# Patient Record
Sex: FEMALE | Race: WHITE | NOT HISPANIC OR LATINO | Employment: FULL TIME | URBAN - METROPOLITAN AREA
[De-identification: names, ages, dates, MRNs, and addresses within clinical notes are randomized per-mention and may not be internally consistent; named-entity substitution may affect disease eponyms.]

---

## 2017-05-10 ENCOUNTER — HISTORICAL (OUTPATIENT)
Dept: ADMINISTRATIVE | Facility: HOSPITAL | Age: 37
End: 2017-05-10

## 2017-06-29 ENCOUNTER — HISTORICAL (OUTPATIENT)
Dept: ADMINISTRATIVE | Facility: HOSPITAL | Age: 37
End: 2017-06-29

## 2017-06-29 LAB
BASOPHILS NFR BLD MANUAL: 3 %
EOSINOPHIL NFR BLD MANUAL: 5 %
ERYTHROCYTE [DISTWIDTH] IN BLOOD BY AUTOMATED COUNT: 11.5 % (ref 11.5–14.5)
GRANULOCYTES NFR BLD MANUAL: 33 %
HCT VFR BLD AUTO: 42.5 % (ref 35–46)
HGB BLD-MCNC: 14.5 GM/DL (ref 12–16)
LYMPHOCYTES NFR BLD MANUAL: 28 %
LYMPHOCYTES NFR BLD MANUAL: 3 %
MCH RBC QN AUTO: 29.5 PG (ref 26–34)
MCHC RBC AUTO-ENTMCNC: 34.1 GM/DL (ref 31–37)
MCV RBC AUTO: 86.6 FL (ref 80–100)
MONOCYTES NFR BLD MANUAL: 17 %
NEUTS BAND NFR BLD MANUAL: 11 %
PLATELET # BLD AUTO: 172 X10(3)/MCL (ref 130–400)
PLATELET # BLD EST: ADEQUATE 10*3/UL
PMV BLD AUTO: 9.8 FL (ref 7.4–10.4)
RBC # BLD AUTO: 4.91 X10(6)/MCL (ref 4–5.2)
RBC MORPH BLD: NORMAL
TSH SERPL-ACNC: 1.31 MIU/L (ref 0.36–3.74)
WBC # SPEC AUTO: 3.8 X10(3)/MCL (ref 4.5–11)

## 2021-09-28 ENCOUNTER — TELEPHONE (OUTPATIENT)
Dept: ORTHOPEDICS | Facility: CLINIC | Age: 41
End: 2021-09-28

## 2021-09-28 DIAGNOSIS — M25.562 PAIN IN BOTH KNEES, UNSPECIFIED CHRONICITY: ICD-10-CM

## 2021-09-28 DIAGNOSIS — M25.551 BILATERAL HIP PAIN: Primary | ICD-10-CM

## 2021-09-28 DIAGNOSIS — M25.552 BILATERAL HIP PAIN: Primary | ICD-10-CM

## 2021-09-28 DIAGNOSIS — M25.561 PAIN IN BOTH KNEES, UNSPECIFIED CHRONICITY: ICD-10-CM

## 2021-09-29 ENCOUNTER — OFFICE VISIT (OUTPATIENT)
Dept: ORTHOPEDICS | Facility: CLINIC | Age: 41
End: 2021-09-29
Payer: MEDICAID

## 2021-09-29 VITALS
HEART RATE: 119 BPM | RESPIRATION RATE: 18 BRPM | SYSTOLIC BLOOD PRESSURE: 148 MMHG | HEIGHT: 68 IN | DIASTOLIC BLOOD PRESSURE: 92 MMHG | BODY MASS INDEX: 21.62 KG/M2 | WEIGHT: 142.63 LBS

## 2021-09-29 DIAGNOSIS — G89.29 CHRONIC PAIN OF BOTH HIPS: ICD-10-CM

## 2021-09-29 DIAGNOSIS — G89.29 CHRONIC PAIN OF BOTH KNEES: ICD-10-CM

## 2021-09-29 DIAGNOSIS — M25.552 CHRONIC PAIN OF BOTH HIPS: ICD-10-CM

## 2021-09-29 DIAGNOSIS — M25.562 CHRONIC PAIN OF BOTH KNEES: ICD-10-CM

## 2021-09-29 DIAGNOSIS — M25.551 CHRONIC PAIN OF BOTH HIPS: ICD-10-CM

## 2021-09-29 DIAGNOSIS — M25.561 CHRONIC PAIN OF BOTH KNEES: ICD-10-CM

## 2021-09-29 DIAGNOSIS — M16.0 PRIMARY OSTEOARTHRITIS OF BOTH HIPS: ICD-10-CM

## 2021-09-29 PROCEDURE — 99999 PR PBB SHADOW E&M-EST. PATIENT-LVL III: ICD-10-PCS | Mod: PBBFAC,,, | Performed by: ORTHOPAEDIC SURGERY

## 2021-09-29 PROCEDURE — 99213 OFFICE O/P EST LOW 20 MIN: CPT | Mod: PBBFAC,PN | Performed by: ORTHOPAEDIC SURGERY

## 2021-09-29 PROCEDURE — 99999 PR PBB SHADOW E&M-EST. PATIENT-LVL III: CPT | Mod: PBBFAC,,, | Performed by: ORTHOPAEDIC SURGERY

## 2021-09-29 PROCEDURE — 99204 PR OFFICE/OUTPT VISIT, NEW, LEVL IV, 45-59 MIN: ICD-10-PCS | Mod: S$PBB,,, | Performed by: ORTHOPAEDIC SURGERY

## 2021-09-29 PROCEDURE — 99204 OFFICE O/P NEW MOD 45 MIN: CPT | Mod: S$PBB,,, | Performed by: ORTHOPAEDIC SURGERY

## 2021-09-29 RX ORDER — LISDEXAMFETAMINE DIMESYLATE 60 MG/1
60 CAPSULE ORAL EVERY MORNING
COMMUNITY
Start: 2021-07-29

## 2021-09-29 RX ORDER — FLUOXETINE HYDROCHLORIDE 40 MG/1
CAPSULE ORAL
COMMUNITY

## 2021-11-03 ENCOUNTER — HOSPITAL ENCOUNTER (OUTPATIENT)
Dept: CARDIOLOGY | Facility: HOSPITAL | Age: 41
Discharge: HOME | End: 2021-11-03
Attending: NURSE PRACTITIONER
Payer: MEDICAID

## 2021-11-03 DIAGNOSIS — Z79.899 ENCOUNTER FOR LONG-TERM (CURRENT) USE OF OTHER MEDICATIONS: ICD-10-CM

## 2021-11-03 PROCEDURE — 93010 ELECTROCARDIOGRAM REPORT: CPT | Mod: ,,, | Performed by: INTERNAL MEDICINE

## 2021-11-03 PROCEDURE — 93010 EKG 12-LEAD: ICD-10-PCS | Mod: ,,, | Performed by: INTERNAL MEDICINE

## 2021-11-03 PROCEDURE — 93005 ELECTROCARDIOGRAM TRACING: CPT | Mod: PO

## 2022-04-07 ENCOUNTER — HISTORICAL (OUTPATIENT)
Dept: ADMINISTRATIVE | Facility: HOSPITAL | Age: 42
End: 2022-04-07
Payer: COMMERCIAL

## 2022-04-24 VITALS
BODY MASS INDEX: 18.91 KG/M2 | WEIGHT: 124.75 LBS | OXYGEN SATURATION: 99 % | SYSTOLIC BLOOD PRESSURE: 108 MMHG | DIASTOLIC BLOOD PRESSURE: 69 MMHG | HEIGHT: 68 IN

## 2022-04-30 NOTE — PROGRESS NOTES
Patient:   Meredith Soriano             MRN: [id number]            FIN: [id number]               Age:   37 years     Sex:  Female     :  1980   Associated Diagnoses:   None   Author:   Daniel De La Rosa MD      Chief Complaint:  PCP establishment    HPI:  37-year-old  female, PCP establishment today in internal medicine clinic.    She was seen 1 month ago here in internal medicine clinic and was complaining of depression, anxiety, and fatigue which has continued.  She has a history of major depressive disorder which has moderately responded to Zoloft however fatigue has worsened.  Previously was on treatment for ADD but not currently on medication.    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  She admits to chronic pain in the right hip periodically sharp and stabbing in quality.      Allergies:   Allergies (1) Active Reaction  No Known Medication Allergies None Documented    Past Medical History:  .Depression  ADHD  Chronic right hip pain    Past Surgical History:   None    Family History:  .No qualifying data available.    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    Highest education: Some college    Exercise    Comment: does not - 2017 14:54 - Cleve Griffin LPN    Home/Environment  05/10/2017  Lives with: Alone    Living situation: Home/Independent    Nutrition/Health  2017  Type of diet: Regular    Substance Abuse  05/10/2017  Use: Never    Tobacco  05/10/2017  Use: Former smoker    Type: Cigarettes    Started at age: 11.0 Years    Comment: Quit at 19y?O - 05/10/2017 08:00 - Virginia Dumont LPN      Medications:Home Medications (1) Active  Pristiq 50 mg oral tablet, extended release  50 mg = 1 tab(s), Oral, Daily    ROS:  Constitutional: Denies weakness, +fatigue, +decreased activity, fever, chills,or sweats  Eye: No recent visual problem, no double vision, no visual disturbance  Ears, Nose, Mouth, Throat: No nasal discharge, pharyngeal erythema, difficulty swallowing, or hearing loss  Respiratory: No shortness of breath, no cough, no sputum production, no hemoptysis, no wheezing  Cardiovascular: No palpitations, no tachycardia, no chest pain, no bradycardia, no peripheral edema, no syncope  Gastrointestinal: No nausea, no vomiting, no diarrhea, no constipation, no abdominal pain  Musculoskeletal: No back pain, no neck pain, no joint pain, no muscle pain, no claudication.  Neurologic: Alert and oriented X4, no confusion, no abnormal balance, no numbness, no tingling, no headache.  Psychiatric: No anxiety, + depression, not delusional, no hallucinations, no inés, not suicidal.      Physical Exam:  Vital Signs (last 24 hrs)_____  Last Charted___________  Temp Oral     37.1 DegC  (JUN 29 14:50)  Heart Rate Peripheral   76 bpm  (JUN 29 14:50)  Resp Rate         18 br/min  (JUN 29 14:50)  SBP      105 mmHg  (JUN 29 14:50)  DBP      73 mmHg  (JUN 29 14:50)  Weight      61.7 kg  (JUN 29 14:50)  Height      172 cm  (JUN 29 14:50)  BMI      20.86  (JUN 29 14:50)     General:  Alert and oriented, No acute distress.    Eye:  Pupils are equal, round and reactive to light, Extraocular movements are intact.    Neck:  Supple, Non-tender, No carotid bruit, No jugular venous distention, No lymphadenopathy, No thyromegaly, Negative hepatojugular reflex.    Respiratory:  Lungs are clear to auscultation, Respirations are non-labored, Breath sounds are equal, Symmetrical chest wall expansion, No chest wall tenderness.    Cardiovascular:  Normal rate, Regular rhythm, No murmur, No gallop, Good pulses equal in all extremities, Normal peripheral perfusion, No edema.    Gastrointestinal:  Soft, Non-tender,  Non-distended, Normal bowel sounds, No organomegaly.    Genitourinary:  No costovertebral angle tenderness.    Musculoskeletal:  Normal range of motion, Normal strength, No tenderness, No swelling, No deformity, Normal gait.    Integumentary:  Warm, No rash.    Neurologic:  Alert, Oriented, Normal sensory, Normal motor function, No focal deficits, Cranial Nerves II-XII are grossly intact, Normal deep tendon reflexes.    Cognition and Speech:  Oriented, Speech clear and coherent.    Psychiatric:  Cooperative, Appropriate mood & affect.        Assessment & Plan:  1.  Major depression  Continues to have depressed mood which has moderately responded to Zoloft however worsening fatigue  She continued to complain of worsening fatigue, weight gain, and lethargy which may be explained by depression however we'll follow-up with CBC and TSH  We'll switch antidepressants to Desvenlafaxine 50 mg    2.  Chronic right hip pain  Follow-up x-ray the right hip revealed protrusio acetabulum  She admits to chronic periodic sharp pains in the right hip when she walks  Refer to orthopedic clinic for evaluation

## 2022-04-30 NOTE — PROGRESS NOTES
"   Patient:   Meredith Soriano             MRN: [id number]            FIN: [id number]               Age:   37 years     Sex:  Female     :  1980   Associated Diagnoses:   None   Author:   Kevin Hernández MD      Basic Information   Chief complaint: Establish PCP, depression    History of present illness: Ms. Soriano is a 37-year-old  female who presents to my clinic.  Establish care.  Patient was sitting in his practitioner at  and last saw her about 6 months ago.  Today, she reports chronic intermittent pain in her right hip which she attributes to being born with a dislocated hip and occasional/intermittent sharp pains in the right thigh that resolved spontaneously.  She also reports that she was diagnosed with ADD and depression and was started on Vyvanse, Zoloft with improved symptoms.  Patient has been off of medicines for many months now.  She requires for new scripts.  Does not report any recent fever, chills, nausea or vomiting, chest pain or shortness of breath, palpitations, dizziness or syncope.  She reports a warty growth on the anterior aspect of right shoulder for the last "few" months.  No pain itching noted.  She is worried if it is cancer.    Past medical history: As documented above    Past surgical history:None reported    Social history: Drinks beer 2-3 times a week, no binge drinking, Cage questionnaire 0, no tobacco or illicit drug use reported    Family history: None reported    Allergies: No medication allergies      Review of Systems   14 point review of systems negative except as documented above      Physical Examination   Vital Signs   5/10/2017 7:54 CDT       Temperature Oral          36.7 DegC                             Peripheral Pulse Rate     73 bpm                             Respiratory Rate          18 br/min                             Systolic Blood Pressure   113 mmHg                             Diastolic Blood Pressure  77 mmHg     General:  Alert and " oriented, No acute distress.    Eye:  Extraocular movements are intact, Normal conjunctiva.    HENT:  Normocephalic, Oral mucosa is moist.    Neck:  Supple, Non-tender, No carotid bruit.    Respiratory:  Lungs are clear to auscultation, Respirations are non-labored, Breath sounds are equal.    Cardiovascular:  Normal rate, Regular rhythm, No murmur, Good pulses equal in all extremities, No edema.    Gastrointestinal:  Soft, Non-tender, Non-distended, Normal bowel sounds.    Integumentary:  Warm, Moist, Multiple freckles, about 3 mm x 3 mm warty lesion noted on the anterior aspect of right shoulder.    Neurologic:  Alert, Oriented, No focal deficits.    Psychiatric:  Cooperative.       Impression and Plan   1.  Major depression  No suicidal or homicidal ideation  Resumed Zoloft 100 mg daily    2.  Chronic right hip pain  No current trauma but born with a dislocated hip  Will obtain x-rays and follow up with results    3.  Referred to minor surgery clinic for wart removal    4.  Refer to gynecology for Pap smear    5.  History of ADDon Vyvanse in the past  Referred to [facility]    We'll follow up in 5-6 months

## 2022-07-08 ENCOUNTER — HOSPITAL ENCOUNTER (OUTPATIENT)
Dept: PULMONOLOGY | Facility: HOSPITAL | Age: 42
Discharge: HOME | End: 2022-07-08
Attending: NURSE PRACTITIONER
Payer: COMMERCIAL

## 2022-07-08 DIAGNOSIS — F90.9 ATTENTION DEFICIT HYPERACTIVITY DISORDER: ICD-10-CM

## 2022-07-08 PROCEDURE — 93010 EKG 12-LEAD: ICD-10-PCS | Mod: ,,, | Performed by: INTERNAL MEDICINE

## 2022-07-08 PROCEDURE — 93010 ELECTROCARDIOGRAM REPORT: CPT | Mod: ,,, | Performed by: INTERNAL MEDICINE

## 2022-07-08 PROCEDURE — 93005 ELECTROCARDIOGRAM TRACING: CPT

## 2022-11-14 ENCOUNTER — HOSPITAL ENCOUNTER (OUTPATIENT)
Dept: RADIOLOGY | Facility: HOSPITAL | Age: 42
Discharge: HOME | End: 2022-11-14
Attending: ORTHOPAEDIC SURGERY
Payer: COMMERCIAL

## 2022-11-14 DIAGNOSIS — M25.551 BILATERAL HIP PAIN: Primary | ICD-10-CM

## 2022-11-14 DIAGNOSIS — M25.552 BILATERAL HIP PAIN: Primary | ICD-10-CM

## 2022-11-14 DIAGNOSIS — M25.552 BILATERAL HIP PAIN: ICD-10-CM

## 2022-11-14 DIAGNOSIS — M25.551 BILATERAL HIP PAIN: ICD-10-CM

## 2022-11-14 PROCEDURE — 73521 X-RAY EXAM HIPS BI 2 VIEWS: CPT | Mod: TC

## 2022-11-14 PROCEDURE — 73521 XR HIPS BILATERAL 2 VIEW INCL AP PELVIS: ICD-10-PCS | Mod: 26,,, | Performed by: RADIOLOGY

## 2022-11-14 PROCEDURE — 73521 X-RAY EXAM HIPS BI 2 VIEWS: CPT | Mod: 26,,, | Performed by: RADIOLOGY

## 2022-11-15 ENCOUNTER — OFFICE VISIT (OUTPATIENT)
Dept: ORTHOPEDICS | Facility: CLINIC | Age: 42
End: 2022-11-15
Payer: COMMERCIAL

## 2022-11-15 ENCOUNTER — OFFICE VISIT (OUTPATIENT)
Dept: SLEEP MEDICINE | Facility: CLINIC | Age: 42
End: 2022-11-15
Payer: COMMERCIAL

## 2022-11-15 VITALS — HEIGHT: 68 IN | BODY MASS INDEX: 23.32 KG/M2 | WEIGHT: 153.88 LBS

## 2022-11-15 VITALS
WEIGHT: 158 LBS | DIASTOLIC BLOOD PRESSURE: 90 MMHG | BODY MASS INDEX: 24.02 KG/M2 | HEART RATE: 102 BPM | SYSTOLIC BLOOD PRESSURE: 133 MMHG

## 2022-11-15 DIAGNOSIS — G47.10 HYPERSOMNOLENCE: Primary | ICD-10-CM

## 2022-11-15 DIAGNOSIS — M16.11 PRIMARY OSTEOARTHRITIS OF RIGHT HIP: Primary | ICD-10-CM

## 2022-11-15 DIAGNOSIS — R35.1 NOCTURIA: ICD-10-CM

## 2022-11-15 PROCEDURE — 1159F PR MEDICATION LIST DOCUMENTED IN MEDICAL RECORD: ICD-10-PCS | Mod: CPTII,S$GLB,, | Performed by: INTERNAL MEDICINE

## 2022-11-15 PROCEDURE — 3008F BODY MASS INDEX DOCD: CPT | Mod: CPTII,S$GLB,, | Performed by: INTERNAL MEDICINE

## 2022-11-15 PROCEDURE — 1159F MED LIST DOCD IN RCRD: CPT | Mod: CPTII,S$GLB,, | Performed by: INTERNAL MEDICINE

## 2022-11-15 PROCEDURE — 3008F BODY MASS INDEX DOCD: CPT | Mod: CPTII,S$GLB,, | Performed by: ORTHOPAEDIC SURGERY

## 2022-11-15 PROCEDURE — 99999 PR PBB SHADOW E&M-EST. PATIENT-LVL II: ICD-10-PCS | Mod: PBBFAC,,, | Performed by: INTERNAL MEDICINE

## 2022-11-15 PROCEDURE — 3008F PR BODY MASS INDEX (BMI) DOCUMENTED: ICD-10-PCS | Mod: CPTII,S$GLB,, | Performed by: INTERNAL MEDICINE

## 2022-11-15 PROCEDURE — 1159F MED LIST DOCD IN RCRD: CPT | Mod: CPTII,S$GLB,, | Performed by: ORTHOPAEDIC SURGERY

## 2022-11-15 PROCEDURE — 99999 PR PBB SHADOW E&M-EST. PATIENT-LVL III: ICD-10-PCS | Mod: PBBFAC,,, | Performed by: ORTHOPAEDIC SURGERY

## 2022-11-15 PROCEDURE — 3008F PR BODY MASS INDEX (BMI) DOCUMENTED: ICD-10-PCS | Mod: CPTII,S$GLB,, | Performed by: ORTHOPAEDIC SURGERY

## 2022-11-15 PROCEDURE — 99999 PR PBB SHADOW E&M-EST. PATIENT-LVL III: CPT | Mod: PBBFAC,,, | Performed by: ORTHOPAEDIC SURGERY

## 2022-11-15 PROCEDURE — 99213 PR OFFICE/OUTPT VISIT, EST, LEVL III, 20-29 MIN: ICD-10-PCS | Mod: S$GLB,,, | Performed by: ORTHOPAEDIC SURGERY

## 2022-11-15 PROCEDURE — 1160F RVW MEDS BY RX/DR IN RCRD: CPT | Mod: CPTII,S$GLB,, | Performed by: ORTHOPAEDIC SURGERY

## 2022-11-15 PROCEDURE — 3080F PR MOST RECENT DIASTOLIC BLOOD PRESSURE >= 90 MM HG: ICD-10-PCS | Mod: CPTII,S$GLB,, | Performed by: INTERNAL MEDICINE

## 2022-11-15 PROCEDURE — 99204 OFFICE O/P NEW MOD 45 MIN: CPT | Mod: S$GLB,,, | Performed by: INTERNAL MEDICINE

## 2022-11-15 PROCEDURE — 1159F PR MEDICATION LIST DOCUMENTED IN MEDICAL RECORD: ICD-10-PCS | Mod: CPTII,S$GLB,, | Performed by: ORTHOPAEDIC SURGERY

## 2022-11-15 PROCEDURE — 3075F PR MOST RECENT SYSTOLIC BLOOD PRESS GE 130-139MM HG: ICD-10-PCS | Mod: CPTII,S$GLB,, | Performed by: INTERNAL MEDICINE

## 2022-11-15 PROCEDURE — 99999 PR PBB SHADOW E&M-EST. PATIENT-LVL II: CPT | Mod: PBBFAC,,, | Performed by: INTERNAL MEDICINE

## 2022-11-15 PROCEDURE — 1160F PR REVIEW ALL MEDS BY PRESCRIBER/CLIN PHARMACIST DOCUMENTED: ICD-10-PCS | Mod: CPTII,S$GLB,, | Performed by: ORTHOPAEDIC SURGERY

## 2022-11-15 PROCEDURE — 99204 PR OFFICE/OUTPT VISIT, NEW, LEVL IV, 45-59 MIN: ICD-10-PCS | Mod: S$GLB,,, | Performed by: INTERNAL MEDICINE

## 2022-11-15 PROCEDURE — 3075F SYST BP GE 130 - 139MM HG: CPT | Mod: CPTII,S$GLB,, | Performed by: INTERNAL MEDICINE

## 2022-11-15 PROCEDURE — 99213 OFFICE O/P EST LOW 20 MIN: CPT | Mod: S$GLB,,, | Performed by: ORTHOPAEDIC SURGERY

## 2022-11-15 PROCEDURE — 3080F DIAST BP >= 90 MM HG: CPT | Mod: CPTII,S$GLB,, | Performed by: INTERNAL MEDICINE

## 2022-11-15 RX ORDER — CLONIDINE HYDROCHLORIDE 0.1 MG/1
0.1 TABLET ORAL NIGHTLY
COMMUNITY
Start: 2022-09-14

## 2022-11-15 NOTE — PROGRESS NOTES
NEW PATIENT VISIT    Meredith Sorinao  is a pleasant 42 y.o. female  with PMH significant for depression who presents for evaluation of sleepiness. The patient started taking lexapro in the last couple weeks and vyvanse in the last year. The patient has nexplanon implant in the  right arm.     Prior sleep studies:   None     Trouble falling asleep?: No  Trouble staying asleep?: No  Hypnotic use?:  Z-quil, melatonin, clonidine (not taking as prescribed)     Bed partner:    Yes  Witnessed snoring ?:   No  Snoring arousals?:  No   Witnessed apneas?  No    Sleepy if inactive?:  Yes  ESS:        SLEEP SCHEDULE   Bed Time 9:30, frequently in front of TV   Sleep Latency 10 minutes    Arousals Once in a while to urinate    Back to sleep No    Wake time 7:15 (can sleep in until mid afternoon on occasion)   Naps Yes, sometimes when home from work    Nocturia 1-2 a night    Work [place],  for last year         Vitals:    11/15/22 0953   BP: (!) 133/90   Pulse: 102   Weight: 71.7 kg (158 lb)       Physical Exam:    GEN:   Well-appearing  Psych:  Appropriate affect, demonstrates insight  SKIN:  No rash on the face or bridge of the nose  HEENT: MP 2,  No significant tonsillar hypertrophy      LABS:   No results found for: HGB, CO2    RECORDS REVIEWED PREVIOUSLY:    No prior sleep testing.    ASSESSMENT    No flowsheet data found.    Presenting Complaint:    PROBLEM DESCRIPTION/ Sx on Presentation  STATUS   screening EMERY   denies snoring, denies snoring arousals, denies witnessed apneas , + current bed partner    New   Daytime Sx   + sleepiness when inactive in the evening  Hard to wake up in the morning  Unintened naps  Medications: vyvanse 60mg started circa 2021  Denies H/H hallucinations, rare sleep paralysis.  No sleep attacks    ESS 8/24 on intake (On vyvanse)  New   Nocturia   x 1-2 per sleep period  New   Other issues: ADHD    PLAN     -very low risk of EMERY  -will give sleep diary  -will pursue PSG  with MSLT out of concern for central hypersomnia in light of severe sleepiness going on for more than 2 months despite adequate sleep time, difficulty waking up in the morning, prolonged TST,  unintended napsand no medications or medical conditions likely to cause sleepiness (note: her ESS is only 8, but she is currently on vyvanse for psychiatric reasons)          RTC          The patient was given open opportunity to ask questions and/or express concerns about treatment plan.   All questions/concerns were discussed.     Two patient identifiers used prior to evaluation.

## 2022-11-18 NOTE — PROGRESS NOTES
"Subjective:       Patient ID: Meredith Soriano is a 42 y.o. female.    Chief Complaint:   Pain of the Right Hip and Pain of the Left Hip  43 yo female reports longstanding h/o atraumatic bilateral hip and knee pain.  Her right hip is by far her worst pain source.  Hip pain localized to groin and anterior thigh, right greater than left, and aggravated with walking and rising from a chair. Notes limited right hip motion with difficulty putting on shoes/socks. She c/o anterior knee pain, aggravated with bending and stair climbing. Notes associated knee clicking/cracking. Denies LBP, motor weakness, paresthesias, knee swelling/locking/giving or constitutional sxs. Takes OTC NSAIDs prn. Walks without assistive device. She was seen at [facility] in 6/2021 and they recommended PT. She had to have braces when she was a child, but no hip surgeries.    No past medical history on file.  No past surgical history on file.  No family history on file.  Social History     Socioeconomic History    Marital status: Single   Tobacco Use    Smoking status: Never    Smokeless tobacco: Never   Substance and Sexual Activity    Alcohol use: Yes    Drug use: Yes     Types: Marijuana       Current Outpatient Medications   Medication Sig Dispense Refill    cloNIDine (CATAPRES) 0.1 MG tablet Take 0.1 mg by mouth every evening.      FLUoxetine 40 MG capsule 1 capsule      VYVANSE 60 mg capsule Take 60 mg by mouth every morning.       No current facility-administered medications for this visit.     Review of patient's allergies indicates:  No Known Allergies      Objective:      Vitals:    11/15/22 1307   Weight: 69.8 kg (153 lb 14.1 oz)   Height: 5' 8" (1.727 m)     Physical Exam  Positive C sign, positive Stinchfield sign.  No tenderness at the greater trochanter or iliotibial band.  No tenderness at the SI joint.  The patient has pain in the groin with passive flexion and internal rotation of the hip which is limited.  Knee benign without " tenderness or effusion, with normal range of motion.  Skin intact.  Distal neurovascular intact.  Flip test negative.    Imaging Review:   X-rays show protrusio of the right hip with moderate arthrosis in no acute findings or focal bone defects.  Assessment:   Right hip DJD with protrusio  Plan:       She has had 1 provider recommend immediate surgery, and another tell her to wait as long as possible.  Our discussion sought a middle ground with her decision for surgery centering around the severity of her pain and whether or not she can control it non operatively.  We will go ahead and have her work with physical therapy, and follow-up with me as she wishes.  The patient's pathophysiology was explained in detail with reference to x-rays, models, other visual aids as appropriate.  Treatment options were discussed in detail.  Questions were invited and answered to the patient's satisfaction.    Eyal Griffith MD  Orthopaedic Surgery

## 2022-11-23 DIAGNOSIS — G47.10 HYPERSOMNOLENCE: Primary | ICD-10-CM

## 2022-11-28 ENCOUNTER — PATIENT MESSAGE (OUTPATIENT)
Dept: SLEEP MEDICINE | Facility: CLINIC | Age: 42
End: 2022-11-28
Payer: COMMERCIAL

## 2022-11-28 DIAGNOSIS — G47.10 HYPERSOMNOLENCE: Primary | ICD-10-CM

## 2022-11-28 DIAGNOSIS — R35.1 NOCTURIA: ICD-10-CM

## 2022-11-30 ENCOUNTER — TELEPHONE (OUTPATIENT)
Dept: SLEEP MEDICINE | Facility: OTHER | Age: 42
End: 2022-11-30
Payer: COMMERCIAL

## 2022-12-01 ENCOUNTER — OFFICE VISIT (OUTPATIENT)
Dept: PODIATRY | Facility: CLINIC | Age: 42
End: 2022-12-01
Payer: COMMERCIAL

## 2022-12-01 DIAGNOSIS — M79.671 RIGHT FOOT PAIN: ICD-10-CM

## 2022-12-01 DIAGNOSIS — M20.10 VALGUS DEFORMITY OF GREAT TOE, UNSPECIFIED LATERALITY: Primary | ICD-10-CM

## 2022-12-01 PROCEDURE — 1160F RVW MEDS BY RX/DR IN RCRD: CPT | Mod: CPTII,S$GLB,, | Performed by: PODIATRIST

## 2022-12-01 PROCEDURE — 1159F PR MEDICATION LIST DOCUMENTED IN MEDICAL RECORD: ICD-10-PCS | Mod: CPTII,S$GLB,, | Performed by: PODIATRIST

## 2022-12-01 PROCEDURE — 99203 PR OFFICE/OUTPT VISIT, NEW, LEVL III, 30-44 MIN: ICD-10-PCS | Mod: S$GLB,,, | Performed by: PODIATRIST

## 2022-12-01 PROCEDURE — 99203 OFFICE O/P NEW LOW 30 MIN: CPT | Mod: S$GLB,,, | Performed by: PODIATRIST

## 2022-12-01 PROCEDURE — 1159F MED LIST DOCD IN RCRD: CPT | Mod: CPTII,S$GLB,, | Performed by: PODIATRIST

## 2022-12-01 PROCEDURE — 1160F PR REVIEW ALL MEDS BY PRESCRIBER/CLIN PHARMACIST DOCUMENTED: ICD-10-PCS | Mod: CPTII,S$GLB,, | Performed by: PODIATRIST

## 2022-12-01 PROCEDURE — 99999 PR PBB SHADOW E&M-EST. PATIENT-LVL III: ICD-10-PCS | Mod: PBBFAC,,, | Performed by: PODIATRIST

## 2022-12-01 PROCEDURE — 99999 PR PBB SHADOW E&M-EST. PATIENT-LVL III: CPT | Mod: PBBFAC,,, | Performed by: PODIATRIST

## 2022-12-01 NOTE — PROGRESS NOTES
Subjective:      Patient ID: Meredith Soriano is a 42 y.o. female.    Chief Complaint: right HAV    42 y.o. female presenting with right foot bunion.  Points medial aspect of the 1st metatarsal head and between 1st and 2nd toe area for pain.  Describes pain as aching.  Patient is ambulating in regular tennis shoes.  Patient has past medical history of chronic hip conditions that will require total hip replacement in the future (right hip).  Denies acute foot injury.  Pain gets worse when she wears closed shoes.      Review of Systems   Constitutional: Negative for chills, decreased appetite, fever and malaise/fatigue.   HENT:  Negative for congestion, ear discharge and sore throat.    Eyes:  Negative for discharge and pain.   Cardiovascular:  Negative for chest pain, claudication and leg swelling.   Respiratory:  Negative for cough and shortness of breath.    Skin:  Negative for color change, nail changes and rash.   Musculoskeletal:  Negative for arthritis, joint pain, joint swelling and muscle weakness.        Right foot pain   Gastrointestinal:  Negative for bloating, abdominal pain, diarrhea, nausea and vomiting.   Genitourinary:  Negative for flank pain and hematuria.   Neurological:  Negative for headaches, numbness and weakness.   Psychiatric/Behavioral:  Negative for altered mental status.            No past medical history on file.    No past surgical history on file.    No family history on file.    Social History     Socioeconomic History    Marital status: Single   Tobacco Use    Smoking status: Never    Smokeless tobacco: Never   Substance and Sexual Activity    Alcohol use: Yes    Drug use: Yes     Types: Marijuana       Current Outpatient Medications   Medication Sig Dispense Refill    cloNIDine (CATAPRES) 0.1 MG tablet Take 0.1 mg by mouth every evening.      FLUoxetine 40 MG capsule 1 capsule      VYVANSE 60 mg capsule Take 60 mg by mouth every morning.       No current facility-administered medications  for this visit.       Review of patient's allergies indicates:  No Known Allergies    There were no vitals filed for this visit.    Objective:      Physical Exam  Constitutional:       General: She is not in acute distress.     Appearance: She is well-developed.   HENT:      Nose: Nose normal.   Eyes:      Conjunctiva/sclera: Conjunctivae normal.   Pulmonary:      Effort: Pulmonary effort is normal.   Chest:      Chest wall: No tenderness.   Abdominal:      Tenderness: There is no abdominal tenderness.   Musculoskeletal:      Cervical back: Normal range of motion.   Neurological:      Mental Status: She is alert and oriented to person, place, and time.   Psychiatric:         Behavior: Behavior normal.       Vascular: Distal DP/PT pulses palpable 2/4. CRT < 3 sec to tips of toes. No vericosities noted to LEs. Hair growth present LE, warm to touch LE, No edema noted to LE.    Dermatologic: No open lesions, lacerations or wounds. Interdigital spaces clean, dry and intact. No erythema, rubor, calor noted LE    Musculoskeletal: MMT 5/5 in DF/PF/Inv/Ev resistance with no reproduction of pain in any direction. Passive range of motion of ankle and pedal joints is painless. No calf tenderness LE, Compartments soft/compressible.   Right foot:  Pain on palpation medial aspect of the 1st met head, and tender to touch between 1st and 2nd toe.  Tracking of 1st metatarsophalangeal joint, 1st metatarsophalangeal joint pain during range of motion.  First MPJ range of motion within normal range. No submet2 pain.     Neurological: Light touch, proprioception, and sharp/dull sensation are all intact. Protective threshold with the Rochester-Wienstein monofilament is intact. Vibratory sensation intact.             Assessment:       Encounter Diagnoses   Name Primary?    Valgus deformity of great toe, unspecified laterality Yes    Right foot pain          Plan:       Diagnoses and all orders for this visit:    Valgus deformity of great toe,  unspecified laterality  -     X-Ray Foot Complete 3 view Bilateral; Future    Right foot pain    I counseled the patient on her conditions, their implications and medical management.    42 y.o. female with right foot HAV.     -right foot x-ray reviewed with the patient.  Met adductus with increased IM.  No osteoarthritic changes at 1st MPJ. Increased HAA.   -different treatment options were discussed with the patient.  I went over both conservative and surgical options.  Patient is not looking for surgery at this time.  Recommend bunion pad, bunion splint, wider toe box shoes.     -I reviewed imaging, clinical history, and diagnosis as above with the patient. I attempted to use layman's terms to educate the patient as well as utilize foot models and/or pictures. I personally went through imaging with the patient.    -The nature of the condition, options for management, as well as potential risks and complications were discussed in detail with patient. Patient was amenable to my recommendations and left my office fully informed and will follow up as instructed or sooner if necessary.    -f/u prn     Note dictated with voice recognition software, please excuse any grammatical errors.

## 2022-12-20 ENCOUNTER — TELEPHONE (OUTPATIENT)
Dept: SLEEP MEDICINE | Facility: OTHER | Age: 42
End: 2022-12-20
Payer: COMMERCIAL

## 2022-12-20 NOTE — TELEPHONE ENCOUNTER
Patient states she called her insurance and they approved the over night sleep study.  I gave her Rima's number for when she's ready to schedule the sleep study.

## 2024-04-15 DIAGNOSIS — M50.30 DDD (DEGENERATIVE DISC DISEASE), CERVICAL: Primary | ICD-10-CM

## 2024-04-18 NOTE — PROGRESS NOTES
DATE: 2024  PATIENT: Meredith Soriano    Supervising Physician: Gil Carranza M.D.    CHIEF COMPLAINT: neck pain, headaches    HISTORY:  Meredith Soriano is a 44 y.o. female here for initial evaluation of neck pain and headaches (Neck - 3, Arm - 0). The pain has been present for years. The patient describes the pain as aching in her neck and headaches that occur after cracking her neck. The pain is worse with nothing and improved by nothing and is constant. There is negative associated numbness and tingling. There is negative subjective weakness. Prior treatments have included home exercises, but no ESIs or surgery. Pt reports being seen in the ED recently for a headache behind her right eye that felt like throbbing. She denies aura or vision changes. Pt says it lasted about a day and went away on its own.    The patient denies myelopathic symptoms such as handwriting changes or difficulty with buttons/coins/keys. Denies perineal paresthesias, bowel/bladder dysfunction.    PAST MEDICAL/SURGICAL HISTORY:  No past medical history on file.  No past surgical history on file.    Medications:  Current Outpatient Medications on File Prior to Visit   Medication Sig Dispense Refill    cloNIDine (CATAPRES) 0.1 MG tablet Take 0.1 mg by mouth every evening.      [] cyclobenzaprine (FLEXERIL) 10 MG tablet Take 1 tablet (10 mg total) by mouth 3 (three) times daily as needed for Muscle spasms. 21 tablet 0    FLUoxetine 40 MG capsule 1 capsule      naproxen (NAPROSYN) 500 MG tablet Take 1 tablet (500 mg total) by mouth 2 (two) times daily with meals. 20 tablet 0    prochlorperazine (COMPAZINE) 10 MG tablet Take 1 tablet (10 mg total) by mouth every 6 (six) hours as needed (headache or nausea). 15 tablet 0    VYVANSE 60 mg capsule Take 60 mg by mouth every morning.       No current facility-administered medications on file prior to visit.       Social History:   Social History     Socioeconomic History    Marital status: Single    Tobacco Use    Smoking status: Never    Smokeless tobacco: Never   Substance and Sexual Activity    Alcohol use: Yes    Drug use: Yes     Types: Marijuana       REVIEW OF SYSTEMS:  Constitution: Negative. Negative for chills, fever and night sweats.   Cardiovascular: Negative for chest pain and syncope.   Respiratory: Negative for cough and shortness of breath.   Gastrointestinal: See HPI. Negative for nausea/vomiting. Negative for abdominal pain.  Genitourinary: See HPI. Negative for discoloration or dysuria.  Skin: Negative for dry skin, itching and rash.   Hematologic/Lymphatic: Negative for bleeding problem. Does not bruise/bleed easily.   Musculoskeletal: Negative for falls and muscle weakness.   Neurological: See HPI. No seizures.   Endocrine: Negative for polydipsia, polyphagia and polyuria.   Allergic/Immunologic: Negative for hives and persistent infections.  Psychiatric/Behavioral: Negative for depression and insomnia.         EXAM:  There were no vitals taken for this visit.    General: The patient is a very pleasant 44 y.o. female in no apparent distress, the patient is oriented to person, place and time.  Psych: Normal mood and affect  HEENT: Vision grossly intact, hearing intact to the spoken word.  Lungs: Respirations unlabored.  Gait: Normal station and gait, no difficulty with toe or heel walk.   Skin: Cervical skin negative for rashes, lesions, hairy patches and surgical scars.  Range of motion: Cervical range of motion is acceptable. There is negative tenderness to palpation.  Spinal Balance: Global saggital and coronal spinal balance acceptable, no significant for scoliosis and kyphosis.  Musculoskeletal: No pain with the range of motion of the bilateral shoulders and elbows. Normal bulk and contour of the bilateral hands.  Vascular: Bilateral hands warm and well perfused, radial pulses 2+ bilaterally.  Neurological: Normal strength and tone in all major motor groups in the bilateral upper and  "lower extremities. Normal sensation to light touch in the C5-T1 and L2-S1 dermatomes bilaterally.  Deep tendon reflexes symmetric 2+ in the bilateral upper and lower extremities.  Negative Inverted Radial Reflex and Motley's bilaterally. Negative Babinski bilaterally.     IMAGING:   Today I personally reviewed MRI cervical report (2021) that details disc bulge at C5-6.    There is no height or weight on file to calculate BMI.    No results found for: "HGBA1C"        ASSESSMENT/PLAN:    There are no diagnoses linked to this encounter.    Today we discussed at length all of the different treatment options including anti-inflammatories, acetaminophen, rest, ice, heat, physical therapy including strengthening and stretching exercises, home exercises, ROM, aerobic conditioning, aqua therapy, other modalities including ultrasound, massage, and dry needling, epidural steroid injections and finally surgical intervention.      Pt presents with chronic neck pain and headaches. Will obtain MRI brain to further evaluate. Will send mobic and robaxin to pharmacy.    "

## 2024-04-19 ENCOUNTER — OFFICE VISIT (OUTPATIENT)
Dept: ORTHOPEDICS | Facility: CLINIC | Age: 44
End: 2024-04-19
Attending: ORTHOPAEDIC SURGERY
Payer: COMMERCIAL

## 2024-04-19 ENCOUNTER — PATIENT MESSAGE (OUTPATIENT)
Dept: ORTHOPEDICS | Facility: CLINIC | Age: 44
End: 2024-04-19

## 2024-04-19 ENCOUNTER — TELEPHONE (OUTPATIENT)
Dept: NEUROLOGY | Facility: CLINIC | Age: 44
End: 2024-04-19
Payer: COMMERCIAL

## 2024-04-19 VITALS — BODY MASS INDEX: 27.26 KG/M2 | WEIGHT: 179.88 LBS | HEIGHT: 68 IN

## 2024-04-19 DIAGNOSIS — G89.29 CHRONIC NONINTRACTABLE HEADACHE, UNSPECIFIED HEADACHE TYPE: Primary | ICD-10-CM

## 2024-04-19 DIAGNOSIS — M50.30 DEGENERATIVE DISC DISEASE, CERVICAL: ICD-10-CM

## 2024-04-19 DIAGNOSIS — R51.9 CHRONIC NONINTRACTABLE HEADACHE, UNSPECIFIED HEADACHE TYPE: Primary | ICD-10-CM

## 2024-04-19 DIAGNOSIS — S16.1XXA STRAIN OF NECK MUSCLE, INITIAL ENCOUNTER: ICD-10-CM

## 2024-04-19 PROCEDURE — 3008F BODY MASS INDEX DOCD: CPT | Mod: CPTII,S$GLB,, | Performed by: ORTHOPAEDIC SURGERY

## 2024-04-19 PROCEDURE — 99214 OFFICE O/P EST MOD 30 MIN: CPT | Mod: S$GLB,,, | Performed by: ORTHOPAEDIC SURGERY

## 2024-04-19 PROCEDURE — 99999 PR PBB SHADOW E&M-EST. PATIENT-LVL III: CPT | Mod: PBBFAC,,, | Performed by: ORTHOPAEDIC SURGERY

## 2024-04-19 PROCEDURE — 1159F MED LIST DOCD IN RCRD: CPT | Mod: CPTII,S$GLB,, | Performed by: ORTHOPAEDIC SURGERY

## 2024-04-19 RX ORDER — METHOCARBAMOL 750 MG/1
750 TABLET, FILM COATED ORAL 3 TIMES DAILY
Qty: 90 TABLET | Refills: 0 | Status: SHIPPED | OUTPATIENT
Start: 2024-04-19 | End: 2024-05-19

## 2024-04-19 RX ORDER — MELOXICAM 15 MG/1
15 TABLET ORAL DAILY
Qty: 30 TABLET | Refills: 0 | Status: SHIPPED | OUTPATIENT
Start: 2024-04-19

## 2024-05-01 ENCOUNTER — HOSPITAL ENCOUNTER (OUTPATIENT)
Dept: RADIOLOGY | Facility: HOSPITAL | Age: 44
Discharge: HOME | End: 2024-05-01
Attending: ORTHOPAEDIC SURGERY
Payer: COMMERCIAL

## 2024-05-01 ENCOUNTER — PATIENT MESSAGE (OUTPATIENT)
Dept: ORTHOPEDICS | Facility: CLINIC | Age: 44
End: 2024-05-01
Payer: COMMERCIAL

## 2024-05-01 DIAGNOSIS — R51.9 CHRONIC NONINTRACTABLE HEADACHE, UNSPECIFIED HEADACHE TYPE: ICD-10-CM

## 2024-05-01 DIAGNOSIS — M50.30 DDD (DEGENERATIVE DISC DISEASE), CERVICAL: ICD-10-CM

## 2024-05-01 DIAGNOSIS — G89.29 CHRONIC NONINTRACTABLE HEADACHE, UNSPECIFIED HEADACHE TYPE: ICD-10-CM

## 2024-05-01 PROCEDURE — 72050 X-RAY EXAM NECK SPINE 4/5VWS: CPT | Mod: TC,FY,PN

## 2024-05-01 PROCEDURE — 70551 MRI BRAIN STEM W/O DYE: CPT | Mod: 26,,, | Performed by: STUDENT IN AN ORGANIZED HEALTH CARE EDUCATION/TRAINING PROGRAM

## 2024-05-01 PROCEDURE — 70551 MRI BRAIN STEM W/O DYE: CPT | Mod: TC,PN

## 2024-05-01 PROCEDURE — 72050 X-RAY EXAM NECK SPINE 4/5VWS: CPT | Mod: 26,,, | Performed by: RADIOLOGY

## 2024-09-03 ENCOUNTER — OFFICE VISIT (OUTPATIENT)
Dept: NEUROLOGY | Facility: CLINIC | Age: 44
End: 2024-09-03
Payer: COMMERCIAL

## 2024-09-03 ENCOUNTER — OFFICE VISIT (OUTPATIENT)
Dept: SLEEP MEDICINE | Facility: CLINIC | Age: 44
End: 2024-09-03
Payer: COMMERCIAL

## 2024-09-03 VITALS
DIASTOLIC BLOOD PRESSURE: 97 MMHG | WEIGHT: 171.88 LBS | HEIGHT: 68 IN | HEART RATE: 98 BPM | SYSTOLIC BLOOD PRESSURE: 145 MMHG | BODY MASS INDEX: 26.06 KG/M2 | SYSTOLIC BLOOD PRESSURE: 145 MMHG | BODY MASS INDEX: 26.05 KG/M2 | HEIGHT: 68 IN | WEIGHT: 171.94 LBS | HEART RATE: 112 BPM | DIASTOLIC BLOOD PRESSURE: 92 MMHG

## 2024-09-03 DIAGNOSIS — G47.30 SLEEP APNEA, UNSPECIFIED TYPE: Primary | ICD-10-CM

## 2024-09-03 DIAGNOSIS — R41.3 MEMORY CHANGE: Primary | ICD-10-CM

## 2024-09-03 DIAGNOSIS — G44.019 EPISODIC CLUSTER HEADACHE, NOT INTRACTABLE: ICD-10-CM

## 2024-09-03 DIAGNOSIS — G44.86 CERVICOGENIC HEADACHE: ICD-10-CM

## 2024-09-03 DIAGNOSIS — R53.83 FATIGUE, UNSPECIFIED TYPE: ICD-10-CM

## 2024-09-03 DIAGNOSIS — Z72.820 POOR SLEEP: ICD-10-CM

## 2024-09-03 PROCEDURE — 3080F DIAST BP >= 90 MM HG: CPT | Mod: CPTII,S$GLB,, | Performed by: STUDENT IN AN ORGANIZED HEALTH CARE EDUCATION/TRAINING PROGRAM

## 2024-09-03 PROCEDURE — 99214 OFFICE O/P EST MOD 30 MIN: CPT | Mod: S$GLB,,, | Performed by: NURSE PRACTITIONER

## 2024-09-03 PROCEDURE — 99999 PR PBB SHADOW E&M-EST. PATIENT-LVL III: CPT | Mod: PBBFAC,,, | Performed by: NURSE PRACTITIONER

## 2024-09-03 PROCEDURE — 3077F SYST BP >= 140 MM HG: CPT | Mod: CPTII,S$GLB,, | Performed by: STUDENT IN AN ORGANIZED HEALTH CARE EDUCATION/TRAINING PROGRAM

## 2024-09-03 PROCEDURE — 99999 PR PBB SHADOW E&M-EST. PATIENT-LVL IV: CPT | Mod: PBBFAC,,, | Performed by: STUDENT IN AN ORGANIZED HEALTH CARE EDUCATION/TRAINING PROGRAM

## 2024-09-03 PROCEDURE — 99204 OFFICE O/P NEW MOD 45 MIN: CPT | Mod: S$GLB,,, | Performed by: STUDENT IN AN ORGANIZED HEALTH CARE EDUCATION/TRAINING PROGRAM

## 2024-09-03 PROCEDURE — 3008F BODY MASS INDEX DOCD: CPT | Mod: CPTII,S$GLB,, | Performed by: STUDENT IN AN ORGANIZED HEALTH CARE EDUCATION/TRAINING PROGRAM

## 2024-09-03 PROCEDURE — 3080F DIAST BP >= 90 MM HG: CPT | Mod: CPTII,S$GLB,, | Performed by: NURSE PRACTITIONER

## 2024-09-03 PROCEDURE — 1159F MED LIST DOCD IN RCRD: CPT | Mod: CPTII,S$GLB,, | Performed by: STUDENT IN AN ORGANIZED HEALTH CARE EDUCATION/TRAINING PROGRAM

## 2024-09-03 PROCEDURE — 1159F MED LIST DOCD IN RCRD: CPT | Mod: CPTII,S$GLB,, | Performed by: NURSE PRACTITIONER

## 2024-09-03 PROCEDURE — 3008F BODY MASS INDEX DOCD: CPT | Mod: CPTII,S$GLB,, | Performed by: NURSE PRACTITIONER

## 2024-09-03 PROCEDURE — 3077F SYST BP >= 140 MM HG: CPT | Mod: CPTII,S$GLB,, | Performed by: NURSE PRACTITIONER

## 2024-09-03 NOTE — PROGRESS NOTES
"Cc: Hypersomnia, new to me    She never had home study done yet. Occasional snoring. Lifetime always waking up tired. +disrupted sleep. +urges to nap and may doze off after lunch when in office, +vivid/lucid dreams occasionally. 8 episodes sleep paralysis her lifetime, last time 1 yr ago. Denies cataplexy or H/H hallucinations. ESS=11-12    BP (!) 145/92 (BP Location: Right arm, Patient Position: Sitting, BP Method: Medium (Manual))   Pulse 98   Ht 5' 8" (1.727 m)   Wt 78 kg (171 lb 13.6 oz)   BMI 26.13 kg/m²     Assessment:  Unspecified sleep apnea  Hypersomnia, despite already taking vyvanse 60mg qd, suspicion for central etiology      Plan:  MSLT denied in past, begin with Home sleep study (if negative pursue MSLT)  "

## 2024-09-03 NOTE — PROGRESS NOTES
Neurology Clinic Note      Date: 9/3/24  Patient Name: Meredith Soriano   MRN: [id number]   PCP: Colette Ventura  Referring Provider: Norm Dodson,     Assessment and Plan:   Meredith Soriano is a 44 y.o. female presenting for evaluation of episodic, unilateral headaches/neck pain that are likely related to cervicogenic headaches.  Continue with Excedrin as needed for now. Will consider a trial of gabapentin/pregabalin if the pain gets worse.  Referral placed to physical therapy.  Discussed the importance of posture.    Check B12 and TSH.  Referral placed to sleep medicine      Problem List Items Addressed This Visit          Neuro    Cervicogenic headache    Relevant Orders    Ambulatory referral/consult to Physical/Occupational Therapy     Other Visit Diagnoses       Memory change    -  Primary    Relevant Orders    Vitamin B12 Deficiency Panel    TSH    Episodic cluster headache, not intractable        Fatigue, unspecified type        Relevant Orders    Ambulatory referral/consult to Sleep Disorders    Poor sleep        Relevant Orders    Ambulatory referral/consult to Sleep Disorders              Subjective:          HPI:   Ms. Meredith Soriano is a 44 y.o. female with a history of ADHD presenting evaluation of neck pain and headaches.    Symptoms have been ongoing since high school.  Complains of neck pain along with headache and pressure on the right side.  Feels like something is 'pushing her eye out'.  Predominantly right-sided.  The headache and pressure are associated with mild photophobia and nausea.  Almost always triggered by neck movements and popping her neck.  Frequency is 1-2/month.  Typically lasts an hour although sometimes, an entire day.  Pain is relieved with Excedrin and usually requires < 5/month.    Recently underwent MRI of the brain which showed no acute pathology.  MRI of the cervical spine in August 2021 showed 'Mild lower cervical degenerative changes at C5-C6, without significant  "central canal or foraminal stenosis. '    Has tried physical therapy in the past which was effective    Also complains of issues with her memory.  Can seem to remember anything and has word-finding difficulty.  This has been going on for about 5 years.  No family history of dementia.    Feels fatigued and tired in the morning.  Sleep is poor.  Melatonin has been ineffective.  Has tried to sleep sleep Medicine but was unable to due to issues with her insurance        PAST MEDICAL HISTORY:  No past medical history on file.    PAST SURGICAL HISTORY:  No past surgical history on file.    CURRENT MEDS:  Current Outpatient Medications   Medication Sig Dispense Refill    cloNIDine (CATAPRES) 0.1 MG tablet Take 0.1 mg by mouth every evening.      FLUoxetine 40 MG capsule 1 capsule      meloxicam (MOBIC) 15 MG tablet Take 1 tablet (15 mg total) by mouth once daily. 30 tablet 0    naproxen (NAPROSYN) 500 MG tablet Take 1 tablet (500 mg total) by mouth 2 (two) times daily with meals. 20 tablet 0    prochlorperazine (COMPAZINE) 10 MG tablet Take 1 tablet (10 mg total) by mouth every 6 (six) hours as needed (headache or nausea). 15 tablet 0    VYVANSE 60 mg capsule Take 60 mg by mouth every morning.       No current facility-administered medications for this visit.       ALLERGIES:  Review of patient's allergies indicates:  No Known Allergies    FAMILY HISTORY:  No family history on file.    SOCIAL HISTORY:  Social History     Tobacco Use    Smoking status: Never    Smokeless tobacco: Never   Substance Use Topics    Alcohol use: Yes    Drug use: Yes     Types: Marijuana       Review of Systems:  12 system review of systems is negative except for the symptoms mentioned in HPI.      Objective:     Vitals:    09/03/24 0758   BP: (!) 145/97   BP Location: Left arm   Patient Position: Sitting   BP Method: Small (Automatic)   Pulse: (!) 112   Weight: 78 kg (171 lb 15.3 oz)   Height: 5' 8" (1.727 m)     General: NAD, well nourished "   Eyes: no tearing, discharge, no erythema   Neck: Supple, full range of motion  Cardiovascular: Warm and well perfused  Lungs: Normal work of breathing  Skin: No rash, lesions, or breakdown on exposed skin  Psychiatry: Mood and affect are appropriate       NEUROLOGICAL EXAMINATION:     MENTAL STATUS   Oriented to person, place, and time.   Follows 2 step commands.   Speech: speech is normal   Level of consciousness: alert    CRANIAL NERVES     CN II   Visual fields full to confrontation.     CN III, IV, VI   Extraocular motions are normal.   Nystagmus: none   Ophthalmoparesis: none    CN V   Facial sensation intact.     CN VII   Facial expression full, symmetric.     CN XI   CN XI normal.     CN XII   CN XII normal.     MOTOR EXAM   Right arm pronator drift: absent  Left arm pronator drift: absent    Strength   Right deltoid: 5/5  Left deltoid: 5/5  Right biceps: 5/5  Left biceps: 5/5  Right triceps: 5/5  Left triceps: 5/5  Right wrist flexion: 5/5  Left wrist flexion: 5/5  Right wrist extension: 5/5  Left wrist extension: 5/5  Right iliopsoas: 5/5  Left iliopsoas: 5/5  Right quadriceps: 5/5  Left quadriceps: 5/5  Right hamstrin/5  Left hamstrin/5  Right glutei: 5/5  Left glutei: 5/5  Right anterior tibial: 5/5  Left anterior tibial: 5/5  Right gastroc: 5/5  Left gastroc: 5/5    REFLEXES     Reflexes   Right brachioradialis: 2+  Left brachioradialis: 2+  Right biceps: 2+  Left biceps: 2+  Right patellar: 2+  Left patellar: 2+  Right Motley: absent  Left Motley: absent    SENSORY EXAM   Light touch normal.     GAIT AND COORDINATION     Gait  Gait: normal     Coordination   Finger to nose coordination: normal    Tremor   Intention tremor: absent        Images:    MRI brain (2024):     FINDINGS:  Cerebral and ventricular volumes are within normal limits.  No evidence of hydrocephalus.     No evidence of acute territorial infarct, intracranial hemorrhage, or mass lesion.  No abnormal restricted  diffusion or susceptibility artifact.  No significant midline shift or mass effect.     Small right choroid fissure cyst.     Midline structures and posterior fossa structures are otherwise unremarkable.  Basal cisterns are clear.  Major vascular flow voids are unremarkable.     Cranium is unremarkable.  Orbits and globes are within normal limits.  Mild mucosal thickening of the maxillary sinuses with left maxillary sinus mucosal retention cyst.  Paranasal sinuses and mastoid air cells are otherwise clear.     Impression:     No acute intracranial abnormality.     Small right choroidal fissure cyst.     Maxillary sinus disease.             Manny Cuellar MD  Department of Neurology  [facility]

## 2024-09-10 ENCOUNTER — TELEPHONE (OUTPATIENT)
Dept: SLEEP MEDICINE | Facility: OTHER | Age: 44
End: 2024-09-10
Payer: COMMERCIAL

## 2024-09-18 ENCOUNTER — TELEPHONE (OUTPATIENT)
Dept: SLEEP MEDICINE | Facility: OTHER | Age: 44
End: 2024-09-18
Payer: COMMERCIAL

## 2024-09-18 NOTE — TELEPHONE ENCOUNTER
I had a message that patient needs to reschedule the home sleep study she has on Sept 24th.  I left a message with my number to reschedule.

## 2024-09-25 ENCOUNTER — TELEPHONE (OUTPATIENT)
Dept: SLEEP MEDICINE | Facility: OTHER | Age: 44
End: 2024-09-25
Payer: COMMERCIAL

## 2024-10-22 ENCOUNTER — PATIENT MESSAGE (OUTPATIENT)
Dept: RESEARCH | Facility: HOSPITAL | Age: 44
End: 2024-10-22
Payer: COMMERCIAL